# Patient Record
Sex: MALE | Race: WHITE | URBAN - METROPOLITAN AREA
[De-identification: names, ages, dates, MRNs, and addresses within clinical notes are randomized per-mention and may not be internally consistent; named-entity substitution may affect disease eponyms.]

---

## 2022-08-25 PROBLEM — Z00.00 ENCOUNTER FOR PREVENTIVE HEALTH EXAMINATION: Status: ACTIVE | Noted: 2022-08-25

## 2023-09-06 ENCOUNTER — EMERGENCY (EMERGENCY)
Facility: HOSPITAL | Age: 38
LOS: 0 days | Discharge: HOME | End: 2023-09-06
Attending: EMERGENCY MEDICINE
Payer: COMMERCIAL

## 2023-09-06 VITALS — TEMPERATURE: 98 F | DIASTOLIC BLOOD PRESSURE: 80 MMHG | SYSTOLIC BLOOD PRESSURE: 131 MMHG | HEART RATE: 80 BPM

## 2023-09-06 VITALS
HEIGHT: 67 IN | DIASTOLIC BLOOD PRESSURE: 87 MMHG | RESPIRATION RATE: 20 BRPM | HEART RATE: 86 BPM | WEIGHT: 184.97 LBS | OXYGEN SATURATION: 97 % | SYSTOLIC BLOOD PRESSURE: 138 MMHG | TEMPERATURE: 99 F
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
## 2023-09-06 DIAGNOSIS — R91.8 OTHER NONSPECIFIC ABNORMAL FINDING OF LUNG FIELD: ICD-10-CM
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
## 2023-09-06 DIAGNOSIS — M54.2 CERVICALGIA: ICD-10-CM

## 2023-09-06 DIAGNOSIS — M54.89 OTHER DORSALGIA: ICD-10-CM

## 2023-09-06 PROCEDURE — 71046 X-RAY EXAM CHEST 2 VIEWS: CPT

## 2023-09-06 PROCEDURE — 99284 EMERGENCY DEPT VISIT MOD MDM: CPT | Mod: 25

## 2023-09-06 PROCEDURE — 99284 EMERGENCY DEPT VISIT MOD MDM: CPT

## 2023-09-06 PROCEDURE — G1004: CPT

## 2023-09-06 PROCEDURE — 71250 CT THORAX DX C-: CPT | Mod: MA

## 2023-09-06 PROCEDURE — 72125 CT NECK SPINE W/O DYE: CPT | Mod: 26,MG

## 2023-09-06 PROCEDURE — 70450 CT HEAD/BRAIN W/O DYE: CPT | Mod: MG

## 2023-09-06 PROCEDURE — 71046 X-RAY EXAM CHEST 2 VIEWS: CPT | Mod: 26

## 2023-09-06 PROCEDURE — 71250 CT THORAX DX C-: CPT | Mod: 26,MA

## 2023-09-06 PROCEDURE — 72125 CT NECK SPINE W/O DYE: CPT | Mod: MG

## 2023-09-06 PROCEDURE — 70450 CT HEAD/BRAIN W/O DYE: CPT | Mod: 26,MG

## 2023-09-06 NOTE — ED PROVIDER NOTE - NSFOLLOWUPINSTRUCTIONS_ED_ALL_ED_FT
Our Emergency Department Referral Coordinators will be reaching out to you in the next 24-48 hours from 9:00am to 5:00pm with a follow up appointment. Please expect a phone call from the hospital in that time frame. If you do not receive a call or if you have any questions or concerns, you can reach them at   [phone]

## 2023-09-06 NOTE — ED PROVIDER NOTE - PHYSICAL EXAMINATION
VITAL SIGNS: I have reviewed nursing notes and confirm.  CONSTITUTIONAL: Well-developed; well-nourished; in no acute distress.   SKIN: skin exam is warm and dry, no acute rash.    HEAD: Normocephalic; atraumatic.  EYES:  conjunctiva and sclera clear.  CARD: S1, S2 normal; no murmurs, gallops, or rubs. Regular rate and rhythm.   RESP: No wheezes, rales or rhonchi.  ABD: Normal bowel sounds; soft; non-distended; non-tender  EXT: no midline spinal tenderness No C spine tenderness Normal ROM.  No clubbing, cyanosis or edema.   NEURO: Alert, oriented, grossly unremarkable

## 2023-09-06 NOTE — ED PROVIDER NOTE - NS ED ATTENDING STATEMENT MOD
This was a shared visit with the LU[facility]. I reviewed and verified the documentation and independently performed the documented:

## 2023-09-06 NOTE — ED PROVIDER NOTE - ATTENDING APP SHARED VISIT CONTRIBUTION OF CARE
38yM  presents after workplace injury - pipe fitting weight >150lb fell onto his posterior head/neck/upper back.  Pt was already braced forward and did not fall or hit his face.  Now c/o mid-back pain, along w/ mild difficulty breathing when he leans back, also discomfort at L neck/shoulder and L lower rib.  Not on a/c.  Pt was seen on scene by medic and took the bus home.  Did not take anything for pain, which he says is manageable at present.

## 2023-09-06 NOTE — ED PROVIDER NOTE - OBJECTIVE STATEMENT
Is a 38-year-old male no medical history here for evaluation of upper back neck pain after large pipe fell on back of his head while at work earlier today.  Patient states pipe weighed approximately 175 pounds.  Patient denies LOC, anticoagulation use, weakness, numbness.  Patient was wearing a hard hat at the time

## 2023-09-06 NOTE — ED PROVIDER NOTE - CLINICAL SUMMARY MEDICAL DECISION MAKING FREE TEXT BOX
38yM p/w multiple areas of MSK pain after pipe fitting fell onto his neck.  Pt w/o focal neuro deficit.  Imaging w/o acute pathology.  Pain controlled.  Recommend supportive care, including o/p f/u for incidentally identified lung nodules, return precautions.

## 2023-09-06 NOTE — ED PROVIDER NOTE - PATIENT PORTAL LINK FT
You can access the FollowMyHealth Patient Portal offered by [facility] by registering at the following website: [email or web address]. By joining [facility]’s FollowMyHealth portal, you will also be able to view your health information using other applications (apps) compatible with our system.

## 2023-09-06 NOTE — ED PROVIDER NOTE - PROGRESS NOTE DETAILS
Work-up negative, patient made aware of lung nodules and given follow-up for pulmonology.  Patient placed in referral program

## 2023-09-07 PROBLEM — Z78.9 OTHER SPECIFIED HEALTH STATUS: Chronic | Status: ACTIVE | Noted: 2023-09-06

## 2023-10-23 ENCOUNTER — APPOINTMENT (OUTPATIENT)
Dept: ORTHOPEDIC SURGERY | Facility: CLINIC | Age: 38
End: 2023-10-23
Payer: COMMERCIAL

## 2023-10-23 DIAGNOSIS — S33.5XXA SPRAIN OF LIGAMENTS OF LUMBAR SPINE, INITIAL ENCOUNTER: ICD-10-CM

## 2023-10-23 PROCEDURE — 99203 OFFICE O/P NEW LOW 30 MIN: CPT

## 2023-10-23 PROCEDURE — 72110 X-RAY EXAM L-2 SPINE 4/>VWS: CPT

## 2023-11-16 ENCOUNTER — APPOINTMENT (OUTPATIENT)
Dept: PULMONOLOGY | Facility: CLINIC | Age: 38
End: 2023-11-16
Payer: COMMERCIAL

## 2023-11-16 VITALS
HEART RATE: 81 BPM | SYSTOLIC BLOOD PRESSURE: 130 MMHG | OXYGEN SATURATION: 97 % | HEIGHT: 67 IN | DIASTOLIC BLOOD PRESSURE: 80 MMHG | BODY MASS INDEX: 31.39 KG/M2 | WEIGHT: 200 LBS

## 2023-11-16 DIAGNOSIS — Z87.891 PERSONAL HISTORY OF NICOTINE DEPENDENCE: ICD-10-CM

## 2023-11-16 PROCEDURE — 94010 BREATHING CAPACITY TEST: CPT

## 2023-11-16 PROCEDURE — 99203 OFFICE O/P NEW LOW 30 MIN: CPT | Mod: 25

## 2023-12-10 ENCOUNTER — RESULT REVIEW (OUTPATIENT)
Age: 38
End: 2023-12-10

## 2023-12-10 ENCOUNTER — OUTPATIENT (OUTPATIENT)
Dept: OUTPATIENT SERVICES | Facility: HOSPITAL | Age: 38
LOS: 1 days | End: 2023-12-10
Payer: COMMERCIAL

## 2023-12-10 DIAGNOSIS — R91.1 SOLITARY PULMONARY NODULE: ICD-10-CM

## 2023-12-10 DIAGNOSIS — Z00.8 ENCOUNTER FOR OTHER GENERAL EXAMINATION: ICD-10-CM

## 2023-12-10 PROCEDURE — 71250 CT THORAX DX C-: CPT | Mod: 26

## 2023-12-10 PROCEDURE — 71250 CT THORAX DX C-: CPT

## 2023-12-11 DIAGNOSIS — R91.1 SOLITARY PULMONARY NODULE: ICD-10-CM

## 2024-01-02 ENCOUNTER — NON-APPOINTMENT (OUTPATIENT)
Age: 39
End: 2024-01-02

## 2024-01-24 ENCOUNTER — APPOINTMENT (OUTPATIENT)
Dept: PULMONOLOGY | Facility: CLINIC | Age: 39
End: 2024-01-24

## 2024-01-27 ENCOUNTER — LABORATORY RESULT (OUTPATIENT)
Age: 39
End: 2024-01-27

## 2024-02-08 ENCOUNTER — APPOINTMENT (OUTPATIENT)
Dept: PULMONOLOGY | Facility: CLINIC | Age: 39
End: 2024-02-08

## 2024-02-27 ENCOUNTER — EMERGENCY (EMERGENCY)
Facility: HOSPITAL | Age: 39
LOS: 0 days | Discharge: HOME | End: 2024-02-27
Attending: EMERGENCY MEDICINE
Payer: COMMERCIAL

## 2024-02-27 VITALS
SYSTOLIC BLOOD PRESSURE: 125 MMHG | HEART RATE: 87 BPM | RESPIRATION RATE: 16 BRPM | DIASTOLIC BLOOD PRESSURE: 79 MMHG | TEMPERATURE: 97 F | WEIGHT: 199.96 LBS | OXYGEN SATURATION: 99 %
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
## 2024-02-27 DIAGNOSIS — T15.02XA FOREIGN BODY IN CORNEA, LEFT EYE, INITIAL ENCOUNTER: ICD-10-CM

## 2024-02-27 DIAGNOSIS — H57.8A2 FOREIGN BODY SENSATION, LEFT EYE: ICD-10-CM

## 2024-02-27 DIAGNOSIS — Z23 ENCOUNTER FOR IMMUNIZATION: ICD-10-CM
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
## 2024-02-27 PROCEDURE — 90715 TDAP VACCINE 7 YRS/> IM: CPT

## 2024-02-27 PROCEDURE — 90471 IMMUNIZATION ADMIN: CPT

## 2024-02-27 PROCEDURE — 65220 REMOVE FOREIGN BODY FROM EYE: CPT | Mod: LT

## 2024-02-27 PROCEDURE — 99283 EMERGENCY DEPT VISIT LOW MDM: CPT | Mod: 25

## 2024-02-27 PROCEDURE — 99284 EMERGENCY DEPT VISIT MOD MDM: CPT | Mod: 25

## 2024-02-27 RX ORDER — CIPROFLOXACIN HCL 0.3 %
2 DROPS OPHTHALMIC (EYE)
Qty: 1 | Refills: 0
Start: 2024-02-27 | End: 2024-03-04

## 2024-02-27 RX ORDER — TETANUS TOXOID, REDUCED DIPHTHERIA TOXOID AND ACELLULAR PERTUSSIS VACCINE, ADSORBED 5; 2.5; 8; 8; 2.5 [IU]/.5ML; [IU]/.5ML; UG/.5ML; UG/.5ML; UG/.5ML
0.5 SUSPENSION INTRAMUSCULAR ONCE
Refills: 0 | Status: COMPLETED | OUTPATIENT
Start: 2024-02-27 | End: 2024-02-27

## 2024-02-27 RX ADMIN — TETANUS TOXOID, REDUCED DIPHTHERIA TOXOID AND ACELLULAR PERTUSSIS VACCINE, ADSORBED 0.5 MILLILITER(S): 5; 2.5; 8; 8; 2.5 SUSPENSION INTRAMUSCULAR at 20:45

## 2024-02-27 NOTE — ED PROVIDER NOTE - CLINICAL SUMMARY MEDICAL DECISION MAKING FREE TEXT BOX
Foreign body was removed and patient left with corneal abrasion.  Tetanus was updated.  Will start antibiotic drops.  Patient will need to follow-up with ophthalmology.  Patient will return if any worsening symptoms or concerns.

## 2024-02-27 NOTE — ED PROVIDER NOTE - CARE PLAN
1 Principal Discharge DX:	Foreign body of left eye  Secondary Diagnosis:	Corneal abrasion   Principal Discharge DX:	Foreign body of left eye  Secondary Diagnosis:	Corneal abrasion  Secondary Diagnosis:	Need for tetanus booster

## 2024-02-27 NOTE — ED PROVIDER NOTE - NSFOLLOWUPINSTRUCTIONS_ED_ALL_ED_FT
Our Emergency Department Referral Coordinators will be reaching out to you in the next 24-48 hours from 9:00am to 5:00pm with a follow up appointment. Please expect a phone call from the hospital in that time frame. If you do not receive a call or if you have any questions or concerns, you can reach them at   [phone]        Corneal Abrasion    The cornea is the clear covering at the front and center of the eye. This very thin tissue is made up of many layers. If a scratch or injury causes the corneal epithelium to come off, it is called a corneal abrasion. Symptoms include eye pain, redness, tearing, difficulty keeping eye open, and light sensitivity. Do not drive or operate machinery if your eye is patched.  Antibiotic eye drops may be prescribed to reduce the risk of infection.  It is important to follow up with an ophthalmologist (eye doctor) to ensure proper healing.    SEEK IMMEDIATE MEDICAL CARE IF YOU HAVE ANY OF THE FOLLOWING SYMPTOMS: discharge from eyes, changes in vision, fever, or swelling.

## 2024-02-27 NOTE — ED PROVIDER NOTE - PHYSICAL EXAMINATION
Vital Signs: I have reviewed the initial vital signs.  Constitutional: well-nourished, appears stated age, no acute distress  Head:  normocephalic  Eyes:PERRLA, EOMI, +foreign body at 6 o'clock, clear conjunctiva, neg mona , +uptake after removal of foreign body   msk: no cervical tenderness, neck supple  Neuro: awake, alert, follows commands, oriented, no focal deficits, GCS 15  ;

## 2024-02-27 NOTE — ED PROVIDER NOTE - ATTENDING APP SHARED VISIT CONTRIBUTION OF CARE
38-year-old male presents for evaluation of possible foreign body to left eye.  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  Patient does not wear contact lenses.  On exam patient in NAD, AAOx3, positive foreign body seen to left side, PERRL, EOMI, foreign body was removed and positive fluorescein uptake for foreign body was after removal.  Negative Florencia sign

## 2024-02-27 NOTE — ED PROVIDER NOTE - PATIENT PORTAL LINK FT
You can access the FollowMyHealth Patient Portal offered by [facility] by registering at the following website: [email or web address]. By joining [facility]’s FollowMyHealth portal, you will also be able to view your health information using other applications (apps) compatible with our system.

## 2024-02-27 NOTE — ED PROVIDER NOTE - OBJECTIVE STATEMENT
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 no visual changes, headaches, excessive tearing. patient with light sensitivity.

## 2024-03-21 ENCOUNTER — APPOINTMENT (OUTPATIENT)
Dept: PULMONOLOGY | Facility: CLINIC | Age: 39
End: 2024-03-21
Payer: COMMERCIAL

## 2024-03-21 DIAGNOSIS — R93.89 ABNORMAL FINDINGS ON DIAGNOSTIC IMAGING OF OTHER SPECIFIED BODY STRUCTURES: ICD-10-CM

## 2024-03-21 PROCEDURE — 99213 OFFICE O/P EST LOW 20 MIN: CPT

## 2024-03-21 NOTE — ASSESSMENT
[FreeTextEntry1] : Small lung nodules stable on repeat CT.  Initially found on CT Chest trauma work up  HO 22 years of cigarette smoking 1-1.5 PPD.  Quit in 2023.

## 2024-03-21 NOTE — HISTORY OF PRESENT ILLNESS
[Home] : at home, [unfilled] , at the time of the visit. [Medical Office: ([facility])___] : at the medical office located in  [Verbal consent obtained from patient] : the patient, [unfilled]

## 2024-04-22 NOTE — ED ADULT NURSE NOTE - NSFALLCONCLUSION_ED_ALL_ED
Problem: Chronic Conditions and Co-morbidities  Goal: Patient's chronic conditions and co-morbidity symptoms are monitored and maintained or improved  Outcome: Adequate for Discharge     Problem: Compression therapy:  Goal: Will be free from complications associated with compression therapy  Description: Will be free from complications associated with compression therapy  Outcome: Adequate for Discharge     Problem: Wound:  Goal: Will show signs of wound healing; wound closure and no evidence of infection  Description: Will show signs of wound healing; wound closure and no evidence of infection  Outcome: Not Progressing     Problem: Venous:  Goal: Signs of wound healing will improve  Description: Signs of wound healing will improve  Outcome: Not Progressing     
Universal Safety Interventions

## 2024-07-28 ENCOUNTER — OUTPATIENT (OUTPATIENT)
Dept: OUTPATIENT SERVICES | Facility: HOSPITAL | Age: 39
LOS: 1 days | End: 2024-07-28
Payer: COMMERCIAL

## 2024-07-28 DIAGNOSIS — R91.1 SOLITARY PULMONARY NODULE: ICD-10-CM

## 2024-07-28 DIAGNOSIS — Z00.8 ENCOUNTER FOR OTHER GENERAL EXAMINATION: ICD-10-CM

## 2024-07-28 PROCEDURE — 71250 CT THORAX DX C-: CPT | Mod: 26

## 2024-07-28 PROCEDURE — 71250 CT THORAX DX C-: CPT

## 2024-07-29 DIAGNOSIS — R91.1 SOLITARY PULMONARY NODULE: ICD-10-CM

## 2024-07-31 ENCOUNTER — APPOINTMENT (OUTPATIENT)
Dept: PULMONOLOGY | Facility: CLINIC | Age: 39
End: 2024-07-31

## 2024-07-31 DIAGNOSIS — R93.89 ABNORMAL FINDINGS ON DIAGNOSTIC IMAGING OF OTHER SPECIFIED BODY STRUCTURES: ICD-10-CM

## 2024-07-31 PROCEDURE — 99213 OFFICE O/P EST LOW 20 MIN: CPT

## 2024-07-31 NOTE — HISTORY OF PRESENT ILLNESS
[Home] : at home, [unfilled] , at the time of the visit. [Medical Office: ([facility])___] : at the medical office located in  [Verbal consent obtained from patient] : the patient, [unfilled]

## 2024-07-31 NOTE — ASSESSMENT
[FreeTextEntry1] : Small lung nodules stable on repeat CT.  Initially found on CT Chest trauma work up  Initial CT 9-23.  Repeat 12-23. SP CT chest July awaiting official read HO 22 years of cigarette smoking 1-1.5 PPD.  Quit in 2023.

## 2024-09-24 ENCOUNTER — APPOINTMENT (OUTPATIENT)
Dept: ORTHOPEDIC SURGERY | Facility: CLINIC | Age: 39
End: 2024-09-24
Payer: COMMERCIAL

## 2024-09-24 DIAGNOSIS — S33.5XXA SPRAIN OF LIGAMENTS OF LUMBAR SPINE, INITIAL ENCOUNTER: ICD-10-CM

## 2024-09-24 PROCEDURE — 99203 OFFICE O/P NEW LOW 30 MIN: CPT

## 2024-09-24 RX ORDER — TIZANIDINE 4 MG/1
4 TABLET ORAL
Qty: 30 | Refills: 0 | Status: ACTIVE | COMMUNITY
Start: 2024-09-24 | End: 1900-01-01

## 2024-09-24 RX ORDER — MELOXICAM 15 MG/1
15 TABLET ORAL
Qty: 30 | Refills: 0 | Status: ACTIVE | COMMUNITY
Start: 2024-09-24 | End: 1900-01-01

## 2024-09-26 NOTE — IMAGING
[de-identified] : Pleasant easy to examine mild discomfort good strength sensation reflexes brisk and symmetric mild pain with flexion extension  X-rays reviewed unremarkable  normal gait

## 2024-09-26 NOTE — HISTORY OF PRESENT ILLNESS
[de-identified] : Really got hurt September 2023 when the pipe fell on his back no problems before went back to work declined therapy but taking some Advil last couple months the pain is gotten worse some tingling mid back no radiating symptoms feels like pins-and-needles no pain at rest 7 with activity no allergies does not smoke has lost weight on [place]gov[place] did see Dr. DE LA TORRE last year x-rays were unremarkable

## 2024-09-26 NOTE — HISTORY OF PRESENT ILLNESS
[de-identified] : Really got hurt September 2023 when the pipe fell on his back no problems before went back to work declined therapy but taking some Advil last couple months the pain is gotten worse some tingling mid back no radiating symptoms feels like pins-and-needles no pain at rest 7 with activity no allergies does not smoke has lost weight on [place]gov[place] did see Dr. DE LA TORRE last year x-rays were unremarkable

## 2024-09-26 NOTE — IMAGING
[de-identified] : Pleasant easy to examine mild discomfort good strength sensation reflexes brisk and symmetric mild pain with flexion extension  X-rays reviewed unremarkable  normal gait

## 2024-09-26 NOTE — ASSESSMENT
[FreeTextEntry1] : Please with his weight reduction he can work we discussed therapy before considering any diagnostics some Advil is fine try to take it regularly I will see him in a few months

## 2024-09-26 NOTE — IMAGING
[de-identified] : Pleasant easy to examine mild discomfort good strength sensation reflexes brisk and symmetric mild pain with flexion extension  X-rays reviewed unremarkable  normal gait

## 2024-09-26 NOTE — HISTORY OF PRESENT ILLNESS
[de-identified] : Really got hurt September 2023 when the pipe fell on his back no problems before went back to work declined therapy but taking some Advil last couple months the pain is gotten worse some tingling mid back no radiating symptoms feels like pins-and-needles no pain at rest 7 with activity no allergies does not smoke has lost weight on [place]gov[place] did see Dr. DE LA TORRE last year x-rays were unremarkable

## 2024-10-21 ENCOUNTER — RX RENEWAL (OUTPATIENT)
Age: 39
End: 2024-10-21

## 2024-10-28 ENCOUNTER — APPOINTMENT (OUTPATIENT)
Dept: ORTHOPEDIC SURGERY | Facility: CLINIC | Age: 39
End: 2024-10-28
Payer: COMMERCIAL

## 2024-10-28 DIAGNOSIS — S33.5XXA SPRAIN OF LIGAMENTS OF LUMBAR SPINE, INITIAL ENCOUNTER: ICD-10-CM

## 2024-10-28 PROCEDURE — 99213 OFFICE O/P EST LOW 20 MIN: CPT

## 2024-11-22 ENCOUNTER — RX RENEWAL (OUTPATIENT)
Age: 39
End: 2024-11-22

## 2024-12-11 ENCOUNTER — APPOINTMENT (OUTPATIENT)
Dept: ORTHOPEDIC SURGERY | Facility: CLINIC | Age: 39
End: 2024-12-11
Payer: COMMERCIAL

## 2024-12-11 DIAGNOSIS — S90.32XA CONTUSION OF LEFT FOOT, INITIAL ENCOUNTER: ICD-10-CM

## 2024-12-11 PROCEDURE — 73660 X-RAY EXAM OF TOE(S): CPT | Mod: LT

## 2024-12-11 PROCEDURE — 99213 OFFICE O/P EST LOW 20 MIN: CPT | Mod: 25

## 2024-12-11 PROCEDURE — 73630 X-RAY EXAM OF FOOT: CPT | Mod: LT

## 2024-12-11 RX ORDER — SEMAGLUTIDE 1 MG/.5ML
1 INJECTION, SOLUTION SUBCUTANEOUS
Refills: 0 | Status: ACTIVE | COMMUNITY

## 2024-12-30 ENCOUNTER — APPOINTMENT (OUTPATIENT)
Dept: ORTHOPEDIC SURGERY | Facility: CLINIC | Age: 39
End: 2024-12-30

## 2025-01-06 ENCOUNTER — APPOINTMENT (OUTPATIENT)
Facility: CLINIC | Age: 40
End: 2025-01-06
Payer: COMMERCIAL

## 2025-01-06 DIAGNOSIS — S90.32XA CONTUSION OF LEFT FOOT, INITIAL ENCOUNTER: ICD-10-CM

## 2025-01-06 PROCEDURE — 99213 OFFICE O/P EST LOW 20 MIN: CPT

## 2025-02-03 ENCOUNTER — APPOINTMENT (OUTPATIENT)
Dept: UROLOGY | Facility: CLINIC | Age: 40
End: 2025-02-03
Payer: COMMERCIAL

## 2025-02-03 VITALS
WEIGHT: 153 LBS | BODY MASS INDEX: 24.01 KG/M2 | SYSTOLIC BLOOD PRESSURE: 116 MMHG | HEART RATE: 96 BPM | HEIGHT: 67 IN | DIASTOLIC BLOOD PRESSURE: 75 MMHG

## 2025-02-03 DIAGNOSIS — Z78.9 OTHER SPECIFIED HEALTH STATUS: ICD-10-CM

## 2025-02-03 DIAGNOSIS — Z80.0 FAMILY HISTORY OF MALIGNANT NEOPLASM OF DIGESTIVE ORGANS: ICD-10-CM

## 2025-02-03 DIAGNOSIS — R86.8 OTHER ABNORMAL FINDINGS IN SPECIMENS FROM MALE GENITAL ORGANS: ICD-10-CM

## 2025-02-03 DIAGNOSIS — Z80.3 FAMILY HISTORY OF MALIGNANT NEOPLASM OF BREAST: ICD-10-CM

## 2025-02-03 DIAGNOSIS — N46.9 MALE INFERTILITY, UNSPECIFIED: ICD-10-CM

## 2025-02-03 PROCEDURE — 99204 OFFICE O/P NEW MOD 45 MIN: CPT

## 2025-02-03 PROCEDURE — G2211 COMPLEX E/M VISIT ADD ON: CPT | Mod: NC

## 2025-02-09 LAB
25(OH)D3 SERPL-MCNC: 36 NG/ML
ALBUMIN SERPL ELPH-MCNC: 4.5 G/DL
ALP BLD-CCNC: 49 U/L
ALT SERPL-CCNC: 27 U/L
AST SERPL-CCNC: 18 U/L
BILIRUB DIRECT SERPL-MCNC: 0.2 MG/DL
BILIRUB INDIRECT SERPL-MCNC: 0.4 MG/DL
BILIRUB SERPL-MCNC: 0.6 MG/DL
ESTRADIOL SERPL-MCNC: 33 PG/ML
FSH SERPL-MCNC: 3.8 IU/L
LH SERPL-ACNC: 6.1 IU/L
PROLACTIN SERPL-MCNC: 11.8 NG/ML
PROT SERPL-MCNC: 6.7 G/DL
SHBG SERPL-SCNC: 40.5 NMOL/L

## 2025-02-13 LAB
TESTOSTERONE FREE/WEAKLY BND: 164.2 NG/DL
TESTOSTERONE TOTAL S: 684 NG/DL
TESTOSTERONE, % FREE/WEAKLY BND: 24 %

## 2025-03-03 ENCOUNTER — APPOINTMENT (OUTPATIENT)
Dept: UROLOGY | Facility: CLINIC | Age: 40
End: 2025-03-03
Payer: COMMERCIAL

## 2025-03-03 VITALS
HEART RATE: 95 BPM | BODY MASS INDEX: 24.01 KG/M2 | SYSTOLIC BLOOD PRESSURE: 119 MMHG | DIASTOLIC BLOOD PRESSURE: 81 MMHG | OXYGEN SATURATION: 100 % | HEIGHT: 67 IN | WEIGHT: 153 LBS | RESPIRATION RATE: 18 BRPM

## 2025-03-03 DIAGNOSIS — N46.9 MALE INFERTILITY, UNSPECIFIED: ICD-10-CM

## 2025-03-03 DIAGNOSIS — R86.8 OTHER ABNORMAL FINDINGS IN SPECIMENS FROM MALE GENITAL ORGANS: ICD-10-CM

## 2025-03-03 PROCEDURE — 99214 OFFICE O/P EST MOD 30 MIN: CPT

## 2025-03-03 PROCEDURE — G2211 COMPLEX E/M VISIT ADD ON: CPT | Mod: NC

## 2025-03-09 ENCOUNTER — OUTPATIENT (OUTPATIENT)
Dept: OUTPATIENT SERVICES | Facility: HOSPITAL | Age: 40
LOS: 1 days | End: 2025-03-09
Payer: COMMERCIAL

## 2025-03-09 DIAGNOSIS — Z00.8 ENCOUNTER FOR OTHER GENERAL EXAMINATION: ICD-10-CM

## 2025-03-09 DIAGNOSIS — R86.8 OTHER ABNORMAL FINDINGS IN SPECIMENS FROM MALE GENITAL ORGANS: ICD-10-CM

## 2025-03-09 PROCEDURE — 76870 US EXAM SCROTUM: CPT | Mod: 26

## 2025-03-09 PROCEDURE — 76870 US EXAM SCROTUM: CPT

## 2025-03-10 DIAGNOSIS — R86.8 OTHER ABNORMAL FINDINGS IN SPECIMENS FROM MALE GENITAL ORGANS: ICD-10-CM

## 2025-03-28 ENCOUNTER — APPOINTMENT (OUTPATIENT)
Dept: UROLOGY | Facility: CLINIC | Age: 40
End: 2025-03-28
Payer: COMMERCIAL

## 2025-03-28 DIAGNOSIS — N50.3 CYST OF EPIDIDYMIS: ICD-10-CM

## 2025-03-28 DIAGNOSIS — N46.9 MALE INFERTILITY, UNSPECIFIED: ICD-10-CM

## 2025-03-28 DIAGNOSIS — R86.8 OTHER ABNORMAL FINDINGS IN SPECIMENS FROM MALE GENITAL ORGANS: ICD-10-CM

## 2025-03-28 DIAGNOSIS — I86.1 SCROTAL VARICES: ICD-10-CM

## 2025-03-28 PROCEDURE — 99214 OFFICE O/P EST MOD 30 MIN: CPT | Mod: 95

## 2025-03-28 PROCEDURE — G2211 COMPLEX E/M VISIT ADD ON: CPT | Mod: NC,95

## 2025-05-07 ENCOUNTER — APPOINTMENT (OUTPATIENT)
Dept: PULMONOLOGY | Facility: CLINIC | Age: 40
End: 2025-05-07